# Patient Record
(demographics unavailable — no encounter records)

---

## 2025-05-02 NOTE — REVIEW OF SYSTEMS
[Abdominal Pain] : abdominal pain [History of kidney stones] : history of kidney stones [Wake up at night to urinate  How many times?  ___] : wakes up to urinate [unfilled] times during the night [Strong urge to urinate] : strong urge to urinate [Bladder pressure] : experiences bladder pressure [Leakage of urine with straining, coughing, laughing] : leakage of urine with straining, coughing, laughing [Joint Pain] : joint pain [Joint Swelling] : joint swelling [Anxiety] : anxiety [Feelings Of Weakness] : feelings of weakness [Negative] : Genitourinary [FreeTextEntry3] : Sinus problem

## 2025-05-02 NOTE — ADDENDUM
[FreeTextEntry1] : I, Laurie Wells assisted in documentation on 05/02/2025 acting as a scribe for Dr. Lewis Cobb. All medical record entries made by my scribe were at my, Dr. Lewis Cobb, direction and personally dictated by me. I have reviewed the chart and agree that the record accurately reflects my personal performance of the history, physical exam, assessment and plan. I have also personally directed, reviewed, and agreed with the chart.

## 2025-05-02 NOTE — HISTORY OF PRESENT ILLNESS
[FreeTextEntry1] :  TIO WHIPPLE is a 33-year-old female presenting for initial visit. Patient has a history of urinary stone formation and passed a 2 mm stone several years ago. She has a 1 mm stone remaining. Over the past week or so, she has noted complaints of bilateral flank pain, increased urinary urgency and frequency, dysuria.

## 2025-05-02 NOTE — END OF VISIT
[FreeTextEntry3] : Recommendations:   KUB XR and CT urogram ordered.   BMP, urinalysis, urine culture, and cytology ordered.   Start Pyridium 200 mg 1 tablet TID after meals PRN.  Start Tamsulosin 0.4 mg 1 tablet qHs.   Start Bactrim DS 1 tablet PO BID.   Patient to return to office if symptoms change or worsen.   Plans to follow.